# Patient Record
Sex: FEMALE | Race: WHITE | URBAN - METROPOLITAN AREA
[De-identification: names, ages, dates, MRNs, and addresses within clinical notes are randomized per-mention and may not be internally consistent; named-entity substitution may affect disease eponyms.]

---

## 2022-12-07 ENCOUNTER — OFFICE VISIT (OUTPATIENT)
Facility: LOCATION | Age: 42
End: 2022-12-07
Payer: COMMERCIAL

## 2022-12-07 DIAGNOSIS — H40.9 GLAUCOMA: ICD-10-CM

## 2022-12-07 DIAGNOSIS — H33.312 OPERCULUM OF RETINA W/O DETACHMENT OF LEFT EYE: Primary | ICD-10-CM

## 2022-12-07 PROCEDURE — 92002 INTRM OPH EXAM NEW PATIENT: CPT | Performed by: OPHTHALMOLOGY

## 2022-12-07 PROCEDURE — 67145 PROPH RTA DTCHMNT PC: CPT | Performed by: OPHTHALMOLOGY

## 2022-12-07 PROCEDURE — 92134 CPTRZ OPH DX IMG PST SGM RTA: CPT | Performed by: OPHTHALMOLOGY

## 2022-12-07 ASSESSMENT — INTRAOCULAR PRESSURE
OD: 13
OS: 14

## 2022-12-07 NOTE — IMPRESSION/PLAN
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 Plan: Exam demonstrates that the patient has a retinal tear temporally OS. We discussed that an untreated retinal tear could progress into a retinal detachment causing significant and irreversible visual loss. The R/B/A of the various treatment options including laser and cryo treatment were discussed with the patient. We recommend urgent same day treatment to reduce the risk of a retinal detachment. Risks of treatment include but are not limited to reduced peripheral vision, pain, swelling, recurrent hemorrhage, progressive retinal tear or retinal detachment, new retinal tear and need for additional laser, cryo, or surgery. After discussing the risks, benefits, indications, and alternatives, the patient elects to proceed with laser treatment to the retinal tear. Laser retinopexy was performed without complication today in clinic RTC with Dr. Ham Wells, with RCA PRN